# Patient Record
Sex: MALE | Race: WHITE | URBAN - METROPOLITAN AREA
[De-identification: names, ages, dates, MRNs, and addresses within clinical notes are randomized per-mention and may not be internally consistent; named-entity substitution may affect disease eponyms.]

---

## 2021-06-11 ENCOUNTER — EMERGENCY (EMERGENCY)
Facility: HOSPITAL | Age: 66
LOS: 1 days | Discharge: HOME | End: 2021-06-11
Attending: EMERGENCY MEDICINE | Admitting: EMERGENCY MEDICINE
Payer: COMMERCIAL

## 2021-06-11 VITALS
TEMPERATURE: 99 F | DIASTOLIC BLOOD PRESSURE: 78 MMHG | HEIGHT: 68 IN | RESPIRATION RATE: 18 BRPM | OXYGEN SATURATION: 97 % | HEART RATE: 55 BPM | WEIGHT: 175.05 LBS | SYSTOLIC BLOOD PRESSURE: 129 MMHG

## 2021-06-11 DIAGNOSIS — R53.83 OTHER FATIGUE: ICD-10-CM

## 2021-06-11 DIAGNOSIS — R11.0 NAUSEA: ICD-10-CM

## 2021-06-11 DIAGNOSIS — T45.2X1A POISONING BY VITAMINS, ACCIDENTAL (UNINTENTIONAL), INITIAL ENCOUNTER: ICD-10-CM

## 2021-06-11 LAB
ALBUMIN SERPL ELPH-MCNC: 3.8 G/DL — SIGNIFICANT CHANGE UP (ref 3.4–5)
ALP SERPL-CCNC: 60 U/L — SIGNIFICANT CHANGE UP (ref 40–120)
ALT FLD-CCNC: 22 U/L — SIGNIFICANT CHANGE UP (ref 12–42)
ANION GAP SERPL CALC-SCNC: 5 MMOL/L — LOW (ref 9–16)
APAP SERPL-MCNC: <2 UG/ML — LOW (ref 10–30)
APPEARANCE UR: CLEAR — SIGNIFICANT CHANGE UP
APTT BLD: 31.9 SEC — SIGNIFICANT CHANGE UP (ref 27.5–35.5)
AST SERPL-CCNC: 24 U/L — SIGNIFICANT CHANGE UP (ref 15–37)
BASOPHILS # BLD AUTO: 0.04 K/UL — SIGNIFICANT CHANGE UP (ref 0–0.2)
BASOPHILS NFR BLD AUTO: 0.5 % — SIGNIFICANT CHANGE UP (ref 0–2)
BILIRUB SERPL-MCNC: 0.6 MG/DL — SIGNIFICANT CHANGE UP (ref 0.2–1.2)
BILIRUB UR-MCNC: NEGATIVE — SIGNIFICANT CHANGE UP
BUN SERPL-MCNC: 15 MG/DL — SIGNIFICANT CHANGE UP (ref 7–23)
CALCIUM SERPL-MCNC: 9 MG/DL — SIGNIFICANT CHANGE UP (ref 8.5–10.5)
CHLORIDE SERPL-SCNC: 106 MMOL/L — SIGNIFICANT CHANGE UP (ref 96–108)
CK SERPL-CCNC: 72 U/L — SIGNIFICANT CHANGE UP (ref 39–308)
CO2 SERPL-SCNC: 32 MMOL/L — HIGH (ref 22–31)
COLOR SPEC: YELLOW — SIGNIFICANT CHANGE UP
CREAT SERPL-MCNC: 1.1 MG/DL — SIGNIFICANT CHANGE UP (ref 0.5–1.3)
DIFF PNL FLD: NEGATIVE — SIGNIFICANT CHANGE UP
EOSINOPHIL # BLD AUTO: 0.5 K/UL — SIGNIFICANT CHANGE UP (ref 0–0.5)
EOSINOPHIL NFR BLD AUTO: 6.6 % — HIGH (ref 0–6)
GLUCOSE SERPL-MCNC: 109 MG/DL — HIGH (ref 70–99)
GLUCOSE UR QL: NEGATIVE — SIGNIFICANT CHANGE UP
HCT VFR BLD CALC: 43.7 % — SIGNIFICANT CHANGE UP (ref 39–50)
HGB BLD-MCNC: 14.6 G/DL — SIGNIFICANT CHANGE UP (ref 13–17)
IMM GRANULOCYTES NFR BLD AUTO: 0.4 % — SIGNIFICANT CHANGE UP (ref 0–1.5)
INR BLD: 1.08 — SIGNIFICANT CHANGE UP (ref 0.88–1.16)
KETONES UR-MCNC: NEGATIVE — SIGNIFICANT CHANGE UP
LACTATE SERPL-SCNC: 0.8 MMOL/L — SIGNIFICANT CHANGE UP (ref 0.4–2)
LEUKOCYTE ESTERASE UR-ACNC: NEGATIVE — SIGNIFICANT CHANGE UP
LYMPHOCYTES # BLD AUTO: 1.5 K/UL — SIGNIFICANT CHANGE UP (ref 1–3.3)
LYMPHOCYTES # BLD AUTO: 19.9 % — SIGNIFICANT CHANGE UP (ref 13–44)
MCHC RBC-ENTMCNC: 28.9 PG — SIGNIFICANT CHANGE UP (ref 27–34)
MCHC RBC-ENTMCNC: 33.4 GM/DL — SIGNIFICANT CHANGE UP (ref 32–36)
MCV RBC AUTO: 86.5 FL — SIGNIFICANT CHANGE UP (ref 80–100)
MONOCYTES # BLD AUTO: 0.85 K/UL — SIGNIFICANT CHANGE UP (ref 0–0.9)
MONOCYTES NFR BLD AUTO: 11.3 % — SIGNIFICANT CHANGE UP (ref 2–14)
NEUTROPHILS # BLD AUTO: 4.63 K/UL — SIGNIFICANT CHANGE UP (ref 1.8–7.4)
NEUTROPHILS NFR BLD AUTO: 61.3 % — SIGNIFICANT CHANGE UP (ref 43–77)
NITRITE UR-MCNC: NEGATIVE — SIGNIFICANT CHANGE UP
NRBC # BLD: 0 /100 WBCS — SIGNIFICANT CHANGE UP (ref 0–0)
PCO2 BLDV: 54 MMHG — HIGH (ref 41–51)
PH BLDV: 7.39 — SIGNIFICANT CHANGE UP (ref 7.32–7.43)
PH UR: 6 — SIGNIFICANT CHANGE UP (ref 5–8)
PLATELET # BLD AUTO: 175 K/UL — SIGNIFICANT CHANGE UP (ref 150–400)
PO2 BLDV: 23 MMHG — LOW (ref 35–40)
POTASSIUM SERPL-MCNC: 4.4 MMOL/L — SIGNIFICANT CHANGE UP (ref 3.5–5.3)
POTASSIUM SERPL-SCNC: 4.4 MMOL/L — SIGNIFICANT CHANGE UP (ref 3.5–5.3)
PROT SERPL-MCNC: 7.3 G/DL — SIGNIFICANT CHANGE UP (ref 6.4–8.2)
PROT UR-MCNC: NEGATIVE MG/DL — SIGNIFICANT CHANGE UP
PROTHROM AB SERPL-ACNC: 12.7 SEC — SIGNIFICANT CHANGE UP (ref 10.6–13.6)
RBC # BLD: 5.05 M/UL — SIGNIFICANT CHANGE UP (ref 4.2–5.8)
RBC # FLD: 14.1 % — SIGNIFICANT CHANGE UP (ref 10.3–14.5)
SALICYLATES SERPL-MCNC: 3.8 MG/DL — SIGNIFICANT CHANGE UP (ref 2.8–20)
SAO2 % BLDV: 42 % — SIGNIFICANT CHANGE UP
SODIUM SERPL-SCNC: 143 MMOL/L — SIGNIFICANT CHANGE UP (ref 132–145)
SP GR SPEC: 1.02 — SIGNIFICANT CHANGE UP (ref 1–1.03)
TROPONIN I SERPL-MCNC: <0.017 NG/ML — LOW (ref 0.02–0.06)
UROBILINOGEN FLD QL: 0.2 E.U./DL — SIGNIFICANT CHANGE UP
WBC # BLD: 7.55 K/UL — SIGNIFICANT CHANGE UP (ref 3.8–10.5)
WBC # FLD AUTO: 7.55 K/UL — SIGNIFICANT CHANGE UP (ref 3.8–10.5)

## 2021-06-11 PROCEDURE — 99285 EMERGENCY DEPT VISIT HI MDM: CPT

## 2021-06-11 NOTE — ED ADULT NURSE NOTE - CHIEF COMPLAINT QUOTE
sent in from [facility] for further evaluation after taking 2400mg of alpha lipoic acid on Sunday. reports feeling better than Sunday when many symptoms started and is only c/o nausea and soreness to kidney. has myasthenia gravis "not doing well on medication".

## 2021-06-11 NOTE — ED ADULT TRIAGE NOTE - CHIEF COMPLAINT QUOTE
sent in from [facility] for further evaluation after taking 2400mg of alpha lipoic acid on Sunday. reports feeling better than Sunday when many symptoms started and is only c/o nausea and soreness to kidney. has myasthenia gravis "not doing well on medication".

## 2021-06-11 NOTE — ED PROVIDER NOTE - PHYSICAL EXAMINATION
Physical Exam  GEN: Awake, alert, non-toxic appearing,   EYES: full EOMI,  ENT: External inspection normal, normal voice,   HEAD: atraumatic  NECK: FROM neck, supple,   CV: rrr, no central cyanosis  RESP: cta bl, no tachypnea, no hypoxia, no resp distress,  GI: nontender  : no cvat  MSK: gonsales x 4  SKIN: no peripheral cyanosis  NEURO: ao x 3, clear and coherent speech, strength equal bl, steady gait

## 2021-06-11 NOTE — CONSULT NOTE ADULT - SUBJECTIVE AND OBJECTIVE BOX
MEDICAL TOXICOLOGY CONSULT    HPI: 65 Yr old male k/c MG now presents 5 days after ingesting a total of 4800 mg of Alpha Lipoic acid for symptoms of fatigue.   Per detail, patient could not make it to ED then hence the delay in presentations. Has been complaining of nausea, now mostly resolved. Has been tolerating PO. No report of co-ingestions.        ONSET / TIME of exposure(s): 5 days ago     QUANTITY of exposure(s): 4800 mg of Alpha Lipoic acid     ROUTE of exposure: Ingestion     CONTEXT of exposure: Home    ASSOCIATED symptoms: Nausea     REVIEW OF SYSTEMS:    Negative except HPI above    Vital Signs Last 24 Hrs  T(C): 37 (11 Jun 2021 13:59), Max: 37 (11 Jun 2021 13:59)  T(F): 98.6 (11 Jun 2021 13:59), Max: 98.6 (11 Jun 2021 13:59)  HR: 55 (11 Jun 2021 13:59) (55 - 55)  BP: 129/78 (11 Jun 2021 13:59) (129/78 - 129/78)  RR: 18 (11 Jun 2021 13:59) (18 - 18)  SpO2: 97% (11 Jun 2021 13:59) (97% - 97%)    SIGNIFICANT LABORATORY STUDIES:                        14.6   7.55  )-----------( 175      ( 11 Jun 2021 14:32 )             43.7     06-11    143  |  106  |  15  ----------------------------<  109<H>  4.4   |  32<H>  |  1.10    Ca    9.0      11 Jun 2021 14:32    TPro  7.3  /  Alb  3.8  /  TBili  0.6  /  DBili  x   /  AST  24  /  ALT  22  /  AlkPhos  60  06-11    PT/INR - ( 11 Jun 2021 15:19 )   PT: 12.7 sec;   INR: 1.08     PTT - ( 11 Jun 2021 15:19 )  PTT:31.9 sec    Anion Gap: 5<L> 06-11 @ 14:32  Aspirin Level: 3.8  06-11 @ 14:32  Acetaminophen Level:  <2.0<L>  06-11 @ 14:32       MEDICAL TOXICOLOGY CONSULT    HPI: 65 Yr old male k/c MG now presents 5 days after ingesting a total of 4800 mg of Alpha Lipoic acid (ALA) for symptoms of fatigue.   Per detail, patient could not make it to ED then hence the delay in presentations. Has been complaining of nausea, now mostly resolved. Has been tolerating PO. No report of co-ingestions.        ONSET / TIME of exposure(s): 5 days ago     QUANTITY of exposure(s): 4800 mg of Alpha Lipoic acid     ROUTE of exposure: Ingestion     CONTEXT of exposure: Home    ASSOCIATED symptoms: Nausea     REVIEW OF SYSTEMS:    Negative except HPI above    Vital Signs Last 24 Hrs  T(C): 37 (11 Jun 2021 13:59), Max: 37 (11 Jun 2021 13:59)  T(F): 98.6 (11 Jun 2021 13:59), Max: 98.6 (11 Jun 2021 13:59)  HR: 55 (11 Jun 2021 13:59) (55 - 55)  BP: 129/78 (11 Jun 2021 13:59) (129/78 - 129/78)  RR: 18 (11 Jun 2021 13:59) (18 - 18)  SpO2: 97% (11 Jun 2021 13:59) (97% - 97%)    SIGNIFICANT LABORATORY STUDIES:                        14.6   7.55  )-----------( 175      ( 11 Jun 2021 14:32 )             43.7     06-11    143  |  106  |  15  ----------------------------<  109<H>  4.4   |  32<H>  |  1.10    Ca    9.0      11 Jun 2021 14:32    TPro  7.3  /  Alb  3.8  /  TBili  0.6  /  DBili  x   /  AST  24  /  ALT  22  /  AlkPhos  60  06-11    PT/INR - ( 11 Jun 2021 15:19 )   PT: 12.7 sec;   INR: 1.08     PTT - ( 11 Jun 2021 15:19 )  PTT:31.9 sec    Anion Gap: 5<L> 06-11 @ 14:32  Aspirin Level: 3.8  06-11 @ 14:32  Acetaminophen Level:  <2.0<L>  06-11 @ 14:32

## 2021-06-11 NOTE — ED PROVIDER NOTE - CLINICAL SUMMARY MEDICAL DECISION MAKING FREE TEXT BOX
will obtain labs, drug level, ua, screening ekg, trop as pt has improving but lingering nausea, clinically not c/w acs

## 2021-06-11 NOTE — ED ADULT NURSE NOTE - OBJECTIVE STATEMENT
pt. sent from  for ingestion of alpha lipoic acid, pt. reports taking about 4800mg. Pt. denies any SI or HI, reports his symptoms from MG have been bad and he was looking for a supplement to help with his symptoms.

## 2021-06-11 NOTE — ED PROVIDER NOTE - NSFOLLOWUPINSTRUCTIONS_ED_ALL_ED_FT
Follow up with your primary care doctor or clinics listed below if you do [place] [address]  To make an appointment, call [phone]   Return immediately for any new or worsening symptoms or any new concerns

## 2021-06-11 NOTE — ED PROVIDER NOTE - OBJECTIVE STATEMENT
65 yom pw after ingesting 4800mg of alpha lipoid acid this weekend (Sunday) as a supplement, denies any coingestion, denies any SI attempt.  pt initially felt nausea, no vomiting, no cp/sob, no abd pain, spoke w/ poison control center Sunday, was advised to go to the ER but pt felt weak and could not do so.  Since then, sx has steadily improved.  able to tolerate PO, able to go to work, still has "a little" nausea.  no urinary sx but L flank discomfort.

## 2021-06-11 NOTE — CONSULT NOTE ADULT - ASSESSMENT
·	Evidence from literature shows that ALA is able to decrease oxidative stress caused by high levels of reactive oxygen and other free radicals. Furthermore, ALA has some activities that provide a cofactor for glucose metabolism in mitochondria. These effects are supposed to be benefi cial for cellular damage and hence its usage in diabetic patients, especially for whom neuropathy is common. In animal studies, high levels of ALA were reported to cause apathy, hypokinesis, convulsions, and hepatotoxicity. Although there are few reported cases for ALA intoxication, it is theorized that ALA has prooxidant effects at high doses, causing redox reactions with iron and affecting mitochondrial permeability. Metabolic acidosis may thus be due to direct mitocondrial effect of ALA. There is also speculation that high levels of ALA may attenuate energy utilization in brain and cause resistant convulsions (PMID: 60002203).   ·	Considering the half life of ALA is 30 mins, which in comparison to the 5 days after which present patient presents, would indicate that he should be unlikely to have any continued side effects.   ·	Agreed to sending basic lab workup. If the patient conitnues to remain vitally stable, labs are reported to be in normal limits and patient has no new active complaints then can be cleared from Toxicology standpoint.   ·	Will suggest to advice the patient for follow up with PMD in concern of any new or progressive symptoms.   ·	Rest of care as per primary team discretion.     Thank you for the consult.

## 2021-06-11 NOTE — ED PROVIDER NOTE - PATIENT PORTAL LINK FT
You can access the FollowMyHealth Patient Portal offered by [facility] [place] by registering at the following website: [email or web address]. By joining [facility]’s FollowMyHealth portal, you will also be able to view your health information using other applications (apps) compatible with our system.

## 2021-06-13 LAB
CULTURE RESULTS: SIGNIFICANT CHANGE UP
SPECIMEN SOURCE: SIGNIFICANT CHANGE UP

## 2024-09-25 NOTE — ED ADULT TRIAGE NOTE - SPO2 (%)
97
< from: 12 Lead ECG (09.13.24 @ 20:57) Ventricular Rate 68 BPM; atrial Rate 68 BPM, P-R Interval 150 ms, QRS Duration 94 ms, Q-T Interval 436 ms  QTC Calculation(Bazett) 463 ms